# Patient Record
Sex: MALE | Race: BLACK OR AFRICAN AMERICAN
[De-identification: names, ages, dates, MRNs, and addresses within clinical notes are randomized per-mention and may not be internally consistent; named-entity substitution may affect disease eponyms.]

---

## 2021-11-26 ENCOUNTER — TRANSCRIPTION ENCOUNTER (OUTPATIENT)
Age: 51
End: 2021-11-26

## 2022-03-04 ENCOUNTER — TRANSCRIPTION ENCOUNTER (OUTPATIENT)
Age: 52
End: 2022-03-04

## 2023-08-03 ENCOUNTER — APPOINTMENT (OUTPATIENT)
Dept: ORTHOPEDIC SURGERY | Facility: CLINIC | Age: 53
End: 2023-08-03
Payer: COMMERCIAL

## 2023-08-03 DIAGNOSIS — Z78.9 OTHER SPECIFIED HEALTH STATUS: ICD-10-CM

## 2023-08-03 PROBLEM — Z00.00 ENCOUNTER FOR PREVENTIVE HEALTH EXAMINATION: Status: ACTIVE | Noted: 2023-08-03

## 2023-08-03 PROCEDURE — 99204 OFFICE O/P NEW MOD 45 MIN: CPT

## 2023-08-03 NOTE — HISTORY OF PRESENT ILLNESS
[de-identified] : Patient presents for evaluation of LT Big toe after dropping a table on his foot yesterday morning. Went to [facility] same day and was told he has a fx. Hydrocortisone for the pain.

## 2023-08-03 NOTE — ASSESSMENT
[FreeTextEntry1] : ELEVATE LEG. APPLY COOL OR WARM COMPRESSES PENDING ON YOUR COMFORT. NSAID FOR PAIN OR TYLENOL WEAR BOOT AS DIRECTED WITH WALKING. CRUTCHES SHOULD BE USED IF INSTRUCTED BY DR FOFANA VITAMIN D 3 AND CALCIUM FOR AIDING IN HEALING.  CALL IF ANY ISSUES OR SEVERE PAIN.  ALL QUESTIONS WERE ANSWERED BY ME REGARDING THE PATIENTS TREATMENT HEALING SHOULD TAKE AT LEAST 6 WEEKS SURGERY MAY BE INDICATED IF HEALING DOES NOT OCCUR.

## 2023-08-03 NOTE — PHYSICAL EXAM
[Moderate] : moderate swelling of toe(s) [1st] : 1st [2+] : posterior tibialis pulse: 2+ [] : mildly antalgic [Left] : left foot [Outside films reviewed] : Outside films reviewed [Fracture] : Fracture [de-identified] : BASE OD DISTAL PHALANX HALLUX MEDIAL , MILDLY DISPLACED.

## 2023-08-03 NOTE — WORK
[Fracture] : fracture [Was the competent medical cause of the injury] : was the competent medical cause of the injury [Are consistent with the injury] : are consistent with the injury [Consistent with my objective findings] : consistent with my objective findings [Total (100%)] : total (100%) [Does not reveal pre-existing condition(s) that may affect treatment/prognosis] : does not reveal pre-existing condition(s) that may affect treatment/prognosis [Cannot return to work because ________] : cannot return to work because [unfilled] [Unknown at this time] : : unknown at this time [Patient] : patient [No Rx restrictions] : No Rx restrictions. [I provided the services listed above] :  I provided the services listed above. [FreeTextEntry1] : EXCELLENT [FreeTextEntry3] :  S DENSEN

## 2023-08-25 ENCOUNTER — APPOINTMENT (OUTPATIENT)
Dept: ORTHOPEDIC SURGERY | Facility: CLINIC | Age: 53
End: 2023-08-25
Payer: OTHER MISCELLANEOUS

## 2023-08-25 DIAGNOSIS — S92.422A DISPLACED FRACTURE OF DISTAL PHALANX OF LEFT GREAT TOE, INITIAL ENCOUNTER FOR CLOSED FRACTURE: ICD-10-CM

## 2023-08-25 PROCEDURE — 99213 OFFICE O/P EST LOW 20 MIN: CPT

## 2023-08-25 PROCEDURE — 73660 X-RAY EXAM OF TOE(S): CPT | Mod: LT

## 2023-08-25 NOTE — HISTORY OF PRESENT ILLNESS
[de-identified] : Patient presents for follow up of LT foot fx DOI 8/2/23. Patient states pain is here and there. Pain has decreased. Is hoping to start work soon.

## 2023-08-25 NOTE — PHYSICAL EXAM
[1st] : 1st [NL 30)] : inversion 30 degrees [NL (40)] : MTP joint DF 40 degrees [NL (20)] : MTP joint PF 20 degrees [2+] : posterior tibialis pulse: 2+ [Normal] : saphenous nerve sensation normal [] : not mildly antalgic [Left] : left foot [Healed fracture] : Healed fracture [FreeTextEntry3] : none

## 2023-08-25 NOTE — WORK
[Does not reveal pre-existing condition(s) that may affect treatment/prognosis] : does not reveal pre-existing condition(s) that may affect treatment/prognosis [Can return to work without limitations on ______] : can return to work without limitations on [unfilled] [Patient] : patient [No Rx restrictions] : No Rx restrictions. [I provided the services listed above] :  I provided the services listed above. [FreeTextEntry1] : EXCELLENT  [FreeTextEntry3] : S DENSEN